# Patient Record
Sex: MALE | Race: WHITE | NOT HISPANIC OR LATINO | Employment: FULL TIME | URBAN - METROPOLITAN AREA
[De-identification: names, ages, dates, MRNs, and addresses within clinical notes are randomized per-mention and may not be internally consistent; named-entity substitution may affect disease eponyms.]

---

## 2021-03-11 ENCOUNTER — OFFICE VISIT (OUTPATIENT)
Dept: FAMILY MEDICINE CLINIC | Facility: CLINIC | Age: 26
End: 2021-03-11

## 2021-03-11 VITALS
SYSTOLIC BLOOD PRESSURE: 130 MMHG | WEIGHT: 266.8 LBS | HEART RATE: 80 BPM | HEIGHT: 78 IN | RESPIRATION RATE: 16 BRPM | TEMPERATURE: 97.7 F | DIASTOLIC BLOOD PRESSURE: 78 MMHG | OXYGEN SATURATION: 99 % | BODY MASS INDEX: 30.87 KG/M2

## 2021-03-11 DIAGNOSIS — Z00.00 WELL ADULT EXAM: Primary | ICD-10-CM

## 2021-03-11 PROCEDURE — 99385 PREV VISIT NEW AGE 18-39: CPT | Performed by: FAMILY MEDICINE

## 2021-03-11 PROCEDURE — 90715 TDAP VACCINE 7 YRS/> IM: CPT | Performed by: FAMILY MEDICINE

## 2021-03-11 PROCEDURE — 90471 IMMUNIZATION ADMIN: CPT | Performed by: FAMILY MEDICINE

## 2021-03-11 NOTE — PROGRESS NOTES
Paolo Rollins is a 26 y.o. male who presents today to establish care and complete annual exam..    Chief Complaint   Patient presents with   • Establish Care     has not seen anyone since his pediatrician   • Annual Exam        Patient is here to establish care as he has not seen a PCP since his pediatrician.  Patient has no chronic medical conditions.  He sees dentist and optometrist regularly.  He has not been seen by the dermatologist.  Patient eats a varied and healthy diet but does not exercise at this time.  Patient has no acute complaints today.       Review of Systems   Constitutional: Negative for fever and unexpected weight loss.   HENT: Negative for congestion, ear pain and sore throat.    Eyes: Negative for visual disturbance.   Respiratory: Negative for cough, shortness of breath and wheezing.    Cardiovascular: Negative for chest pain and palpitations.   Gastrointestinal: Negative for abdominal pain, blood in stool, constipation, diarrhea, nausea, vomiting and GERD.   Endocrine: Negative for polydipsia and polyuria.   Genitourinary: Negative for difficulty urinating.   Musculoskeletal: Negative for joint swelling.   Skin: Negative for rash and skin lesions.   Allergic/Immunologic: Negative for environmental allergies.   Neurological: Negative for seizures and syncope.   Hematological: Does not bruise/bleed easily.   Psychiatric/Behavioral: Negative for suicidal ideas.        PHQ-9 Depression Screening  Little interest or pleasure in doing things? 0   Feeling down, depressed, or hopeless? 0   Trouble falling or staying asleep, or sleeping too much?     Feeling tired or having little energy?     Poor appetite or overeating?     Feeling bad about yourself - or that you are a failure or have let yourself or your family down?     Trouble concentrating on things, such as reading the newspaper or watching television?     Moving or speaking so slowly that other people could have noticed? Or the opposite -  "being so fidgety or restless that you have been moving around a lot more than usual?     Thoughts that you would be better off dead, or of hurting yourself in some way?     PHQ-9 Total Score 0   If you checked off any problems, how difficult have these problems made it for you to do your work, take care of things at home, or get along with other people?         Past Medical History:   Diagnosis Date   • Concussion     x 3 at age 10/13/and 15        Past Surgical History:   Procedure Laterality Date   • WISDOM TOOTH EXTRACTION          Family History   Problem Relation Age of Onset   • Depression Mother    • No Known Problems Father    • Mental illness Sister         borderline personality disorder   • Ovarian cancer Maternal Grandmother    • Diabetes Maternal Grandfather    • Heart disease Maternal Grandfather    • Heart disease Paternal Grandmother    • Heart disease Paternal Grandfather    • Pancreatic cancer Paternal Uncle    • Thyroid cancer Paternal Uncle         Social History     Socioeconomic History   • Marital status:      Spouse name: Not on file   • Number of children: Not on file   • Years of education: Not on file   • Highest education level: Not on file   Tobacco Use   • Smoking status: Never Smoker   • Smokeless tobacco: Never Used   Vaping Use   • Vaping Use: Never used   Substance and Sexual Activity   • Alcohol use: Yes     Alcohol/week: 2.0 standard drinks     Types: 1 Glasses of wine, 1 Cans of beer per week     Comment: occasional   • Drug use: Never   • Sexual activity: Yes     Partners: Female     Birth control/protection: None        No current outpatient medications on file prior to visit.     No current facility-administered medications on file prior to visit.       No Known Allergies     Visit Vitals  /78 (BP Location: Right arm, Patient Position: Sitting, Cuff Size: Adult)   Pulse 80   Temp 97.7 °F (36.5 °C) (Temporal)   Resp 16   Ht 198.1 cm (78\")   Wt 121 kg (266 lb 12.8 " oz)   SpO2 99%   BMI 30.83 kg/m²      Body mass index is 30.83 kg/m².    Physical Exam  Constitutional:       General: He is not in acute distress.     Appearance: He is well-developed. He is not diaphoretic.   HENT:      Head: Atraumatic.   Cardiovascular:      Rate and Rhythm: Normal rate and regular rhythm.      Heart sounds: Normal heart sounds. No murmur. No friction rub. No gallop.    Pulmonary:      Effort: Pulmonary effort is normal. No respiratory distress.      Breath sounds: Normal breath sounds. No wheezing or rales.   Musculoskeletal:      Cervical back: Normal range of motion and neck supple.   Skin:     General: Skin is warm and dry.   Neurological:      Mental Status: He is alert and oriented to person, place, and time.   Psychiatric:         Behavior: Behavior normal.          No results found for this or any previous visit.     Problems Addressed this Visit        Health Encounters    Well adult exam - Primary     The patient is here for health maintenance visit.  Currently, the patient consumes a healthy diet and has an inadequate exercise regimen.  Screening lab work is ordered.  Immunizations were reviewed today.  Advice and education was given regarding nutrition, aerobic exercise, routine dental evaluations, routine eye exams, reproductive health, cardiovascular risk reduction, sunscreen use, self skin examination (annual dermatology evaluations) and seatbelt use (general overall safety).  Further recommendations will be given if needed after lab evaluation.  Annual wellness evaluation is recommended.           Relevant Orders    Tdap Vaccine Greater Than or Equal To 8yo IM      Diagnoses       Codes Comments    Well adult exam    -  Primary ICD-10-CM: Z00.00  ICD-9-CM: V70.0           Return in about 1 year (around 3/11/2022) for Annual.    Parts of this office note have been dictated by voice recognition software. Grammatical and/or spelling errors may be present.     Roque Grant,  MD  3/11/2021

## 2021-03-11 NOTE — PATIENT INSTRUCTIONS
Preventive Care 21-39 Years Old, Male  Preventive care refers to lifestyle choices and visits with your health care provider that can promote health and wellness. This includes:  · A yearly physical exam. This is also called an annual well check.  · Regular dental and eye exams.  · Immunizations.  · Screening for certain conditions.  · Healthy lifestyle choices, such as eating a healthy diet, getting regular exercise, not using drugs or products that contain nicotine and tobacco, and limiting alcohol use.  What can I expect for my preventive care visit?  Physical exam  Your health care provider will check:  · Height and weight. These may be used to calculate body mass index (BMI), which is a measurement that tells if you are at a healthy weight.  · Heart rate and blood pressure.  · Your skin for abnormal spots.  Counseling  Your health care provider may ask you questions about:  · Alcohol, tobacco, and drug use.  · Emotional well-being.  · Home and relationship well-being.  · Sexual activity.  · Eating habits.  · Work and work environment.  What immunizations do I need?    Influenza (flu) vaccine  · This is recommended every year.  Tetanus, diphtheria, and pertussis (Tdap) vaccine  · You may need a Td booster every 10 years.  Varicella (chickenpox) vaccine  · You may need this vaccine if you have not already been vaccinated.  Human papillomavirus (HPV) vaccine  · If recommended by your health care provider, you may need three doses over 6 months.  Measles, mumps, and rubella (MMR) vaccine  · You may need at least one dose of MMR. You may also need a second dose.  Meningococcal conjugate (MenACWY) vaccine  · One dose is recommended if you are 19-21 years old and a first-year college student living in [place], or if you have one of several medical conditions. You may also need additional booster doses.  Pneumococcal conjugate (PCV13) vaccine  · You may need this if you have certain conditions and were not  previously vaccinated.  Pneumococcal polysaccharide (PPSV23) vaccine  · You may need one or two doses if you smoke cigarettes or if you have certain conditions.  Hepatitis A vaccine  · You may need this if you have certain conditions or if you travel or work in places where you may be exposed to hepatitis A.  Hepatitis B vaccine  · You may need this if you have certain conditions or if you travel or work in places where you may be exposed to hepatitis B.  Haemophilus influenzae type b (Hib) vaccine  · You may need this if you have certain risk factors.  You may receive vaccines as individual doses or as more than one vaccine together in one shot (combination vaccines). Talk with your health care provider about the risks and benefits of combination vaccines.  What tests do I need?  Blood tests  · Lipid and cholesterol levels. These may be checked every 5 years starting at age 20.  · Hepatitis C test.  · Hepatitis B test.  Screening    · Diabetes screening. This is done by checking your blood sugar (glucose) after you have not eaten for a while (fasting).  · Sexually transmitted disease (STD) testing.  Talk with your health care provider about your test results, treatment options, and if necessary, the need for more tests.  Follow these instructions at home:  Eating and drinking    · Eat a diet that includes fresh fruits and vegetables, whole grains, lean protein, and low-fat dairy products.  · Take vitamin and mineral supplements as recommended by your health care provider.  · Do not drink alcohol if your health care provider tells you not to drink.  · If you drink alcohol:  ? Limit how much you have to 0-2 drinks a day.  ? Be aware of how much alcohol is in your drink. In the [place] one drink equals one 12 oz bottle of beer (355 mL), one 5 oz glass of wine (148 mL), or one 1½ oz glass of hard liquor (44 mL).  Lifestyle  · Take daily care of your teeth and gums.  · Stay active. Exercise for at least 30 minutes on 5 or  more days each week.  · Do not use any products that contain nicotine or tobacco, such as cigarettes, e-cigarettes, and chewing tobacco. If you need help quitting, ask your health care provider.  · If you are sexually active, practice safe sex. Use a condom or other form of protection to prevent STIs (sexually transmitted infections).  What's next?  · Go to your health care provider once a year for a well check visit.  · Ask your health care provider how often you should have your eyes and teeth checked.  · Stay up to date on all vaccines.  This information is not intended to replace advice given to you by your health care provider. Make sure you discuss any questions you have with your health care provider.  Document Revised: 12/12/2019 Document Reviewed: 12/12/2019  Elsevier Patient Education © 2020 [employer]

## 2021-11-18 PROCEDURE — U0004 COV-19 TEST NON-CDC HGH THRU: HCPCS | Performed by: NURSE PRACTITIONER

## 2022-03-22 ENCOUNTER — OFFICE VISIT (OUTPATIENT)
Dept: FAMILY MEDICINE CLINIC | Facility: CLINIC | Age: 27
End: 2022-03-22

## 2022-03-22 VITALS
WEIGHT: 264 LBS | HEART RATE: 66 BPM | SYSTOLIC BLOOD PRESSURE: 130 MMHG | OXYGEN SATURATION: 99 % | DIASTOLIC BLOOD PRESSURE: 80 MMHG | HEIGHT: 78 IN | TEMPERATURE: 97.8 F | BODY MASS INDEX: 30.55 KG/M2

## 2022-03-22 DIAGNOSIS — Z00.00 WELL ADULT EXAM: Primary | ICD-10-CM

## 2022-03-22 PROCEDURE — 99395 PREV VISIT EST AGE 18-39: CPT | Performed by: FAMILY MEDICINE

## 2022-03-22 NOTE — PROGRESS NOTES
Paolo Rollins is a 27 y.o. male who presents today to complete annual exam.    Chief Complaint   Patient presents with   • Annual Exam        Patient presents for annual exam with no acute complaints.    Diet: regular. Patient working on portion control and cooking at home more.     Physical activity: walks dog a couple miles daily. No exercise regimen    Sleep: no sleep issues. Average 8 hours a night    Mood: no mood issues PHQ-2: 0    Up to date on dentist  Not up to date on optometry    Immunizations:  Denies annual flu vaccine  No COVID booster. He got COVID in January 2022         Review of Systems   Constitutional: Negative for chills and fever.   Eyes: Negative for blurred vision.   Respiratory: Negative for shortness of breath.    Cardiovascular: Negative for chest pain.   Gastrointestinal: Negative for abdominal pain, blood in stool, constipation, diarrhea, nausea and vomiting.   Genitourinary: Negative for dysuria.   Allergic/Immunologic: Negative for environmental allergies.   Neurological: Negative for dizziness, seizures, syncope and headache.   Psychiatric/Behavioral: Negative for suicidal ideas and depressed mood. The patient is not nervous/anxious.         PHQ-9 Depression Screening  Little interest or pleasure in doing things? 0-->not at all   Feeling down, depressed, or hopeless? 0-->not at all   Trouble falling or staying asleep, or sleeping too much?     Feeling tired or having little energy?     Poor appetite or overeating?     Feeling bad about yourself - or that you are a failure or have let yourself or your family down?     Trouble concentrating on things, such as reading the newspaper or watching television?     Moving or speaking so slowly that other people could have noticed? Or the opposite - being so fidgety or restless that you have been moving around a lot more than usual?     Thoughts that you would be better off dead, or of hurting yourself in some way?     PHQ-9 Total Score 0   If  "you checked off any problems, how difficult have these problems made it for you to do your work, take care of things at home, or get along with other people?         Past Medical History:   Diagnosis Date   • Concussion     x 3 at age 10/13/and 15        Past Surgical History:   Procedure Laterality Date   • WISDOM TOOTH EXTRACTION          Family History   Problem Relation Age of Onset   • Depression Mother    • Miscarriages / Stillbirths Mother    • No Known Problems Father    • Mental illness Sister    • Depression Sister    • Ovarian cancer Maternal Grandmother    • Diabetes Maternal Grandfather    • Heart disease Maternal Grandfather    • Heart disease Paternal Grandmother    • Heart disease Paternal Grandfather    • Pancreatic cancer Paternal Uncle    • Thyroid cancer Paternal Uncle         Social History     Socioeconomic History   • Marital status:    Tobacco Use   • Smoking status: Never Smoker   • Smokeless tobacco: Never Used   Vaping Use   • Vaping Use: Never used   Substance and Sexual Activity   • Alcohol use: Yes     Alcohol/week: 2.0 standard drinks     Types: 1 Glasses of wine, 1 Cans of beer per week     Comment: occasional   • Drug use: Never   • Sexual activity: Yes     Partners: Female     Birth control/protection: None     Comment: wife only        No current outpatient medications on file prior to visit.     No current facility-administered medications on file prior to visit.       No Known Allergies     Visit Vitals  /80   Pulse 66   Temp 97.8 °F (36.6 °C)   Ht 198.1 cm (78\")   Wt 120 kg (264 lb)   SpO2 99%   BMI 30.51 kg/m²      Body mass index is 30.51 kg/m².    Physical Exam  Constitutional:       Appearance: Normal appearance.   Cardiovascular:      Rate and Rhythm: Normal rate and regular rhythm.      Heart sounds: Normal heart sounds. No murmur heard.    No friction rub. No gallop.   Pulmonary:      Effort: Pulmonary effort is normal. No respiratory distress.      Breath " sounds: Normal breath sounds. No stridor. No wheezing, rhonchi or rales.   Abdominal:      General: Bowel sounds are normal. There is no distension.      Palpations: Abdomen is soft. There is no mass.      Tenderness: There is no abdominal tenderness. There is no guarding or rebound.      Hernia: No hernia is present.   Lymphadenopathy:      Cervical: No cervical adenopathy.   Skin:     General: Skin is warm and dry.   Neurological:      Mental Status: He is alert and oriented to person, place, and time.   Psychiatric:         Mood and Affect: Mood normal.          Body mass index is 30.51 kg/m².       Problems Addressed this Visit        Health Encounters    Well adult exam - Primary     The patient is here for health maintenance visit.  Currently, the patient consumes a healthy diet and has an inadequate exercise regimen.  Screening lab work is ordered.  Immunizations were reviewed today.  Advice and education was given regarding nutrition, aerobic exercise, routine dental evaluations, routine eye exams, reproductive health, cardiovascular risk reduction, sunscreen use, self skin examination (annual dermatology evaluations) and seatbelt use (general overall safety).  Further recommendations will be given if needed after lab evaluation.  Annual wellness evaluation is recommended.               Diagnoses       Codes Comments    Well adult exam    -  Primary ICD-10-CM: Z00.00  ICD-9-CM: V70.0           Return in about 1 year (around 3/22/2023) for Annual.    I attest that I have reviewed the student note and that the components of the history of the present illness, the physical exam, and the assessment and plan documented were performed by me or were performed in my presence by the student and verified by me.    Roque Grant MD  3/22/2022

## 2022-03-22 NOTE — PATIENT INSTRUCTIONS

## 2022-09-12 DIAGNOSIS — N46.9 INFERTILITY MALE: Primary | ICD-10-CM

## 2022-11-08 ENCOUNTER — OFFICE VISIT (OUTPATIENT)
Dept: UROLOGY | Facility: CLINIC | Age: 27
End: 2022-11-08

## 2022-11-08 VITALS — BODY MASS INDEX: 30.55 KG/M2 | HEIGHT: 78 IN | WEIGHT: 264 LBS

## 2022-11-08 DIAGNOSIS — N46.01 INFERTILITY DUE TO AZOOSPERMIA: Primary | ICD-10-CM

## 2022-11-08 PROCEDURE — 99204 OFFICE O/P NEW MOD 45 MIN: CPT | Performed by: UROLOGY

## 2022-11-08 NOTE — PROGRESS NOTES
Office Visit [facility]      Patient Name: Paolo Rollins  : 1995   MRN: [id number]     Chief Complaint: Male infertility    Referring Provider: Roque Grant MD    History of Present Illness: Paolo Rollins is a 27 y.o. male who presents to Urology today for evaluation regarding concern for male infertility.  Patient is an otherwise healthy 27-year-old male.  He reports that he is .  He and his wife have attempted pregnancy over a 1 year period unsuccessfully.  She has undergone female factor work-up which has been unremarkable.  Patient underwent semen analysis which was ordered by his wife's OB/GYN.  Semen analysis demonstrates azoospermia.    The patient denies past urologic evaluation including instrumentation or procedure.  He denies any knowledge of childhood urologic diagnoses including undescended testicle.  Denies any childhood viral illness such as mumps.  He denies new onset headache, visual change, change in sense of smell.  He denies lower urinary tract symptoms.  Denies obstructive urinary symptoms.  Denies any difficulty with erection.  He denies hematuria or history of urinary tract infection/STI.  He does report a potential family history of infertility and a maternal grandfather.  He is uncertain regarding information.    He has not had a metabolic or hormonal lab panel obtained at this time.    Patient is a never smoker, denies illicit drug use.      Subjective      Review of System: Review of Systems   Constitutional: Negative for chills, fatigue, fever and unexpected weight change.   HENT: Negative for sore throat.    Eyes: Negative for visual disturbance.   Respiratory: Negative for cough, chest tightness and shortness of breath.    Cardiovascular: Negative for chest pain and leg swelling.   Gastrointestinal: Negative for blood in stool, constipation, diarrhea, nausea, rectal pain and vomiting.   Genitourinary: Negative for decreased urine volume, difficulty  urinating, dysuria, enuresis, flank pain, frequency, genital sores, hematuria and urgency.   Musculoskeletal: Negative for back pain and joint swelling.   Skin: Negative for rash and wound.   Neurological: Negative for seizures, speech difficulty, weakness and headaches.   Psychiatric/Behavioral: Negative for confusion, sleep disturbance and suicidal ideas. The patient is not nervous/anxious.       I have reviewed the ROS documented by my clinical staff, updated appropriately and I agree. Syed Cruz MD    Past Medical History:   Past Medical History:   Diagnosis Date   • Concussion     x 3 at age 10/13/and 15       Past Surgical History:   Past Surgical History:   Procedure Laterality Date   • WISDOM TOOTH EXTRACTION         Family History:   Family History   Problem Relation Age of Onset   • Depression Mother    • Miscarriages / Stillbirths Mother    • No Known Problems Father    • Mental illness Sister    • Depression Sister    • Ovarian cancer Maternal Grandmother    • Cancer Maternal Grandmother         Ovarian   • Diabetes Maternal Grandfather    • Heart disease Maternal Grandfather    • Heart disease Paternal Grandmother    • Heart disease Paternal Grandfather    • Pancreatic cancer Paternal Uncle    • Thyroid cancer Paternal Uncle    • Cancer Paternal Uncle         Thyroid   • Cancer Paternal Uncle         Pancreatic       Social History:   Social History     Socioeconomic History   • Marital status:    Tobacco Use   • Smoking status: Never   • Smokeless tobacco: Never   Vaping Use   • Vaping Use: Never used   Substance and Sexual Activity   • Alcohol use: Yes     Alcohol/week: 2.0 standard drinks     Types: 1 Glasses of wine, 1 Cans of beer per week     Comment: occasional   • Drug use: Never   • Sexual activity: Yes     Partners: Female     Birth control/protection: None     Comment: wife only       Medications:   No current outpatient medications on file.    Allergies:   No Known  "Allergies        Objective     Physical Exam:   Vital Signs:   Vitals:    11/08/22 1445   Weight: 120 kg (264 lb)   Height: 198.1 cm (77.99\")   PainSc: 0-No pain     Body mass index is 30.51 kg/m².     Physical Exam  Vitals and nursing note reviewed.   Constitutional:       Appearance: Normal appearance.   HENT:      Head: Normocephalic and atraumatic.   Cardiovascular:      Comments: Well perfused  Pulmonary:      Effort: Pulmonary effort is normal.   Abdominal:      General: Abdomen is flat.      Palpations: Abdomen is soft.   Musculoskeletal:         General: Normal range of motion.   Skin:     General: Skin is warm and dry.   Neurological:      General: No focal deficit present.      Mental Status: He is alert and oriented to person, place, and time. Mental status is at baseline.   Psychiatric:         Mood and Affect: Mood normal.         Behavior: Behavior normal.         Thought Content: Thought content normal.         Judgment: Judgment normal.         Genitourinary  Penis: circumcised penis, glans normal, no penile discharge.  No rashes/lesions.    Testes: descended bilaterally, no masses, nontender to palpation.  Bilateral palpable vas deferens remainder of scrotal contents normal. No hernia appreciated.      Labs:   Brief Urine Lab Results     None               No results found for: GLUCOSE, CALCIUM, NA, K, CO2, CL, BUN, CREATININE, EGFRIFAFRI, EGFRIFNONA, BCR, ANIONGAP    No results found for: WBC, HGB, HCT, MCV, PLT    Semen Analysis       Measures:   Tobacco:   Paolo Rollins  reports that he has never smoked. He has never used smokeless tobacco.. I have educated him on the risk of diseases from using tobacco products.  Assessment / Plan      Assessment/Plan:   27 y.o. male is here today for initial evaluation regarding work-up for male factor infertility.  We have reviewed semen analysis which was obtained above.  Normal volume, parameters, pH.  Azoospermia has been demonstrated.  Today we have " discussed the indication for repeat semen analysis for confirmation.  We have discussed the indication for metabolic and hormonal lab values.  We have reviewed potential etiologies for azoospermia, obstructive azoospermia versus nonobstructive azoospermia.  He denies any past urologic diagnoses, instrumentation or procedure.  He has an unremarkable past medical history.    During our discussion today we have described above initial work-up.  We have discussed potential referral to infertility specialist Dr. Orozco in [facility].  We have discussed that we are happy to obtain repeat semen analysis and initial metabolic work-up, make a referral or patient may contact infertility specialist for initial referral and work-up.  After our discussion today he reports that he would like to see infertility specialist.  We have discussed that we are happy to help in referral, if he has any further questions or concerns we are happy to assist in his care.    Diagnoses and all orders for this visit:    1. 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    I spent approximately 45 minutes providing clinical care for this patient; including review of patient's chart and provider documentation, face to face time spent with patient in examination room (obtaining history, performing physical exam, discussing diagnosis and management options), placing orders, and completing patient documentation.     Syed Cruz MD  [facility] [place]

## 2022-11-12 PROBLEM — N46.01 INFERTILITY DUE TO AZOOSPERMIA: Status: ACTIVE | Noted: 2022-11-12

## 2023-04-11 ENCOUNTER — TRANSCRIBE ORDERS (OUTPATIENT)
Dept: LAB | Facility: HOSPITAL | Age: 28
End: 2023-04-11
Payer: COMMERCIAL

## 2023-04-11 ENCOUNTER — LAB (OUTPATIENT)
Dept: LAB | Facility: HOSPITAL | Age: 28
End: 2023-04-11
Payer: COMMERCIAL

## 2023-04-11 DIAGNOSIS — Z11.3 SCREENING EXAMINATION FOR VENEREAL DISEASE: ICD-10-CM

## 2023-04-11 DIAGNOSIS — Z11.4 SCREENING FOR HUMAN IMMUNODEFICIENCY VIRUS: ICD-10-CM

## 2023-04-11 DIAGNOSIS — Z11.3 SCREENING EXAMINATION FOR VENEREAL DISEASE: Primary | ICD-10-CM

## 2023-04-11 PROCEDURE — 86644 CMV ANTIBODY: CPT

## 2023-04-11 PROCEDURE — 86803 HEPATITIS C AB TEST: CPT

## 2023-04-11 PROCEDURE — 86703 HIV-1/HIV-2 1 RESULT ANTBDY: CPT

## 2023-04-11 PROCEDURE — 86780 TREPONEMA PALLIDUM: CPT

## 2023-04-11 PROCEDURE — 87801 DETECT AGNT MULT DNA AMPLI: CPT

## 2023-04-11 PROCEDURE — 87340 HEPATITIS B SURFACE AG IA: CPT

## 2023-04-11 PROCEDURE — 36415 COLL VENOUS BLD VENIPUNCTURE: CPT

## 2023-04-11 PROCEDURE — 86790 VIRUS ANTIBODY NOS: CPT

## 2023-04-11 PROCEDURE — 87491 CHLMYD TRACH DNA AMP PROBE: CPT

## 2023-04-11 PROCEDURE — 87591 N.GONORRHOEAE DNA AMP PROB: CPT

## 2023-04-11 PROCEDURE — 86704 HEP B CORE ANTIBODY TOTAL: CPT

## 2023-04-14 LAB
C TRACH RRNA SPEC DONR QL NAA+PROBE: NEGATIVE
CMV AB SERPL DONR QL: NON REACTIVE
HBV CORE AB SER DONR QL IA: NEGATIVE
HBV SURFACE AG SER DONR QL IA: NEGATIVE
HCV AB SER DONR QL IA: NEGATIVE
HIV 1+2 AB+HIV1 P24 AG SERPL QL IA: NEGATIVE
HIV1 RNA SERPL DONR QL PROBE AMP: NORMAL
HTLV I+II AB SER DONR QL: NEGATIVE
N GONORRHOEA RRNA SPEC DONR QL NAA+PROBE: NEGATIVE
T PALLIDUM IGG SER DONR QL: NON REACTIVE

## 2023-06-08 ENCOUNTER — OFFICE VISIT (OUTPATIENT)
Dept: FAMILY MEDICINE CLINIC | Facility: CLINIC | Age: 28
End: 2023-06-08
Payer: COMMERCIAL

## 2023-06-08 VITALS
WEIGHT: 274.2 LBS | HEART RATE: 63 BPM | BODY MASS INDEX: 33.39 KG/M2 | HEIGHT: 76 IN | SYSTOLIC BLOOD PRESSURE: 126 MMHG | DIASTOLIC BLOOD PRESSURE: 90 MMHG | TEMPERATURE: 98 F | RESPIRATION RATE: 22 BRPM | OXYGEN SATURATION: 99 %

## 2023-06-08 DIAGNOSIS — Z00.00 WELL ADULT EXAM: Primary | ICD-10-CM

## 2023-06-08 DIAGNOSIS — R20.2 TINGLING IN EXTREMITIES: ICD-10-CM

## 2023-06-08 PROCEDURE — 99395 PREV VISIT EST AGE 18-39: CPT | Performed by: FAMILY MEDICINE

## 2023-06-08 NOTE — PATIENT INSTRUCTIONS

## 2023-06-08 NOTE — ASSESSMENT & PLAN NOTE
The patient is here for health maintenance visit.  Currently, the patient consumes a healthy diet and has an adequate exercise regimen.   Advice and education was given regarding nutrition, aerobic exercise, routine dental evaluations, routine eye exams, reproductive health, cardiovascular risk reduction, sunscreen use, self skin examination (annual dermatology evaluations) and seatbelt use (general overall safety). Annual wellness evaluation is recommended.

## 2023-06-08 NOTE — PROGRESS NOTES
Paolo Rollins is a 28 y.o. male who presents today to complete annual exam.    Chief Complaint   Patient presents with    Annual Exam        Patient is here for annual exam. He walks the dog every day for exercise. He has been working on improving his diet and has been eating salads for lunch and has lost some weight. He eats a small healthy breakfast. Dinner is typically a protein with veggies and a starch. He sees the dentist twice a year and optometrist once a year. He has not seen the dermatologist. He has had some depressed mood lately related to finding out he can not have children but he feels this will pass and does not want meds or counseling. He denies HI/SI. He has also had some tingling and numbness in his hands and legs but it only occurs when he has been sitting with his legs crossed or in his hands when leaning on his elbow or sleeping on his arm.        Review of Systems   Constitutional:  Negative for fever and unexpected weight loss.   HENT:  Negative for congestion, ear pain and sore throat.    Eyes:  Negative for visual disturbance.   Respiratory:  Negative for cough, shortness of breath and wheezing.    Cardiovascular:  Negative for chest pain and palpitations.   Gastrointestinal:  Negative for abdominal pain, blood in stool, constipation, diarrhea, nausea, vomiting and GERD.   Endocrine: Negative for polydipsia and polyuria.   Genitourinary:  Negative for difficulty urinating.   Musculoskeletal:  Negative for joint swelling.   Skin:  Negative for rash and skin lesions.   Allergic/Immunologic: Negative for environmental allergies.   Neurological:  Positive for numbness (tingling and numbness like his limbs are asleep after compression such as sleeping on arm or crossing legs). Negative for seizures and syncope.   Hematological:  Does not bruise/bleed easily.   Psychiatric/Behavioral:  Positive for dysphoric mood and depressed mood. Negative for self-injury and suicidal ideas.       PHQ-9  Depression Screening  Little interest or pleasure in doing things? 0-->not at all   Feeling down, depressed, or hopeless? 1-->several days   Trouble falling or staying asleep, or sleeping too much?     Feeling tired or having little energy?     Poor appetite or overeating?     Feeling bad about yourself - or that you are a failure or have let yourself or your family down?     Trouble concentrating on things, such as reading the newspaper or watching television?     Moving or speaking so slowly that other people could have noticed? Or the opposite - being so fidgety or restless that you have been moving around a lot more than usual?     Thoughts that you would be better off dead, or of hurting yourself in some way?     PHQ-9 Total Score 1   If you checked off any problems, how difficult have these problems made it for you to do your work, take care of things at home, or get along with other people?         Past Medical History:   Diagnosis Date    Concussion     x 3 at age [age]/and 15        Past Surgical History:   Procedure Laterality Date    WISDOM TOOTH EXTRACTION          Family History   Problem Relation Age of Onset    Depression Mother     Miscarriages / Stillbirths Mother     Atrial fibrillation Father     Mental illness Sister     Depression Sister     Ovarian cancer Maternal Grandmother     Diabetes Maternal Grandfather     Heart disease Maternal Grandfather     Heart disease Paternal Grandmother     Heart disease Paternal Grandfather     Hearing loss Paternal Uncle         Caused by medication as a baby    Cancer Paternal Uncle         Thyroid    Cancer Paternal Uncle         Pancreatic        Social History     Socioeconomic History    Marital status:    Tobacco Use    Smoking status: Never    Smokeless tobacco: Never   Vaping Use    Vaping Use: Never used   Substance and Sexual Activity    Alcohol use: Yes     Alcohol/week: 2.0 standard drinks     Types: 1 Glasses of wine, 1 Cans of beer per  "week     Comment: occasional    Drug use: Never    Sexual activity: Yes     Partners: Female     Birth control/protection: None     Comment: wife only        No current outpatient medications on file prior to visit.     No current facility-administered medications on file prior to visit.       No Known Allergies     Visit Vitals  /90   Pulse 63   Temp 98 °F (36.7 °C)   Resp 22   Ht 193 cm (76\")   Wt 124 kg (274 lb 3.2 oz)   SpO2 99%   BMI 33.38 kg/m²      Body mass index is 33.38 kg/m².    Physical Exam  Constitutional:       General: He is not in acute distress.     Appearance: He is well-developed. He is not diaphoretic.   HENT:      Head: Atraumatic.   Cardiovascular:      Rate and Rhythm: Normal rate and regular rhythm.      Heart sounds: Normal heart sounds. No murmur heard.    No friction rub. No gallop.   Pulmonary:      Effort: Pulmonary effort is normal. No respiratory distress.      Breath sounds: Normal breath sounds. No stridor. No wheezing, rhonchi or rales.   Abdominal:      General: Bowel sounds are normal. There is no distension.      Palpations: Abdomen is soft. There is no mass.      Tenderness: There is no abdominal tenderness. There is no guarding or rebound.      Hernia: No hernia is present.   Musculoskeletal:      Right shoulder: Normal.      Left shoulder: Normal.      Right elbow: Normal.      Left elbow: Normal.      Cervical back: Normal range of motion and neck supple.   Skin:     General: Skin is warm and dry.   Neurological:      Mental Status: He is alert and oriented to person, place, and time.      Motor: No weakness.   Psychiatric:         Behavior: Behavior normal.             Problems Addressed this Visit          Health Encounters    Well adult exam - Primary     The patient is here for health maintenance visit.  Currently, the patient consumes a healthy diet and has an adequate exercise regimen.   Advice and education was given regarding nutrition, aerobic exercise, " routine dental evaluations, routine eye exams, reproductive health, cardiovascular risk reduction, sunscreen use, self skin examination (annual dermatology evaluations) and seatbelt use (general overall safety). Annual wellness evaluation is recommended.              Symptoms and Signs    Tingling in extremities     Tingling in extremities only after compression.  Patient was offered further evaluation such as EMG and blood work but he declines at this time.  He return to clinic if symptoms fail to persist or worsen.          Diagnoses         Codes Comments    Well adult exam    -  Primary ICD-10-CM: Z00.00  ICD-9-CM: V70.0     Tingling in extremities     ICD-10-CM: R20.2  ICD-9-CM: 782.0             Return in about 1 year (around 6/8/2024) for Annual.    Roque Grant MD  6/8/2023

## 2023-06-08 NOTE — ASSESSMENT & PLAN NOTE
Tingling in extremities only after compression.  Patient was offered further evaluation such as EMG and blood work but he declines at this time.  He return to clinic if symptoms fail to persist or worsen.

## 2024-09-17 ENCOUNTER — OFFICE VISIT (OUTPATIENT)
Dept: FAMILY MEDICINE CLINIC | Facility: CLINIC | Age: 29
End: 2024-09-17
Payer: COMMERCIAL

## 2024-09-17 VITALS
BODY MASS INDEX: 32.05 KG/M2 | HEIGHT: 78 IN | HEART RATE: 72 BPM | OXYGEN SATURATION: 99 % | SYSTOLIC BLOOD PRESSURE: 122 MMHG | TEMPERATURE: 98 F | DIASTOLIC BLOOD PRESSURE: 80 MMHG | WEIGHT: 277 LBS

## 2024-09-17 DIAGNOSIS — F41.9 ANXIETY: ICD-10-CM

## 2024-09-17 DIAGNOSIS — Z83.2 FAMILY HISTORY OF FACTOR V LEIDEN MUTATION: ICD-10-CM

## 2024-09-17 DIAGNOSIS — E66.09 CLASS 1 OBESITY DUE TO EXCESS CALORIES WITHOUT SERIOUS COMORBIDITY WITH BODY MASS INDEX (BMI) OF 32.0 TO 32.9 IN ADULT: ICD-10-CM

## 2024-09-17 DIAGNOSIS — Z00.00 WELL ADULT EXAM: Primary | ICD-10-CM

## 2024-09-17 PROBLEM — E66.811 CLASS 1 OBESITY DUE TO EXCESS CALORIES WITHOUT SERIOUS COMORBIDITY WITH BODY MASS INDEX (BMI) OF 32.0 TO 32.9 IN ADULT: Status: ACTIVE | Noted: 2024-09-17

## 2024-09-17 PROBLEM — Q93.59: Status: ACTIVE | Noted: 2024-09-17

## 2024-09-17 PROCEDURE — 99213 OFFICE O/P EST LOW 20 MIN: CPT | Performed by: FAMILY MEDICINE

## 2024-09-17 PROCEDURE — 99395 PREV VISIT EST AGE 18-39: CPT | Performed by: FAMILY MEDICINE

## 2024-09-17 RX ORDER — VENLAFAXINE HYDROCHLORIDE 37.5 MG/1
37.5 CAPSULE, EXTENDED RELEASE ORAL DAILY
Qty: 90 CAPSULE | Refills: 1 | Status: SHIPPED | OUTPATIENT
Start: 2024-09-17

## 2024-09-17 RX ORDER — BUSPIRONE HYDROCHLORIDE 5 MG/1
5 TABLET ORAL 3 TIMES DAILY PRN
Qty: 90 TABLET | Refills: 1 | Status: SHIPPED | OUTPATIENT
Start: 2024-09-17

## 2024-11-12 ENCOUNTER — OFFICE VISIT (OUTPATIENT)
Dept: FAMILY MEDICINE CLINIC | Facility: CLINIC | Age: 29
End: 2024-11-12
Payer: COMMERCIAL

## 2024-11-12 VITALS
OXYGEN SATURATION: 98 % | HEIGHT: 78 IN | HEART RATE: 82 BPM | DIASTOLIC BLOOD PRESSURE: 90 MMHG | BODY MASS INDEX: 32.12 KG/M2 | TEMPERATURE: 98.2 F | SYSTOLIC BLOOD PRESSURE: 126 MMHG | WEIGHT: 277.6 LBS

## 2024-11-12 DIAGNOSIS — F41.9 ANXIETY: Primary | ICD-10-CM

## 2024-11-12 PROCEDURE — 99213 OFFICE O/P EST LOW 20 MIN: CPT | Performed by: FAMILY MEDICINE

## 2024-11-12 RX ORDER — VENLAFAXINE HYDROCHLORIDE 37.5 MG/1
37.5 CAPSULE, EXTENDED RELEASE ORAL DAILY
Qty: 90 CAPSULE | Refills: 3 | Status: SHIPPED | OUTPATIENT
Start: 2024-11-12

## 2024-11-12 NOTE — PROGRESS NOTES
Paolo Rollins is a 29 y.o. male who presents today for Anxiety      Patient was seen back in September and was having anxiety issues.  His MERCEDES-7 at that time was a 10.  Patient was started on Effexor to take daily and BuSpar as needed. Patient has been taking effexor and has tolerated it well so far. He has used the buspar once or twice and feels like it helped as well.        MERCEDES-7  Over the last two weeks, how often have you been bothered by the following problems?  Feeling nervous, anxious or on edge: 0  Not being able to stop or control worryin  Worrying too much about different things: 0  Trouble Relaxin  Being so restless that it is hard to sit still: 1  Becoming easily annoyed or irritable: 1  Feeling afraid as if something awful might happen: 1  MERCEDES 7 Total Score: 3  If you checked any problems, how difficult have these problems made it for you to do your work, take care of things at home, or get along with other people: Not difficult at all        2024    11:18 AM   PHQ-2/PHQ-9 Depression Screening   Little interest or pleasure in doing things Not at all   Feeling down, depressed, or hopeless Not at all   How difficult have these problems made it for you to do your work, take care of things at home, or get along with other people? Not difficult at all         Review of Systems   Constitutional:  Negative for fever and unexpected weight loss.   HENT:  Negative for congestion, ear pain and sore throat.    Eyes:  Negative for visual disturbance.   Respiratory:  Negative for cough, shortness of breath and wheezing.    Cardiovascular:  Negative for chest pain and palpitations.   Gastrointestinal:  Negative for abdominal pain, blood in stool, constipation, diarrhea, nausea, vomiting and GERD.   Endocrine: Negative for polydipsia and polyuria.   Genitourinary:  Negative for difficulty urinating.   Musculoskeletal:  Negative for joint swelling.   Skin:  Negative for rash and skin lesions.  "  Allergic/Immunologic: Negative for environmental allergies.   Neurological:  Negative for seizures and syncope.   Hematological:  Does not bruise/bleed easily.   Psychiatric/Behavioral:  Negative for suicidal ideas. The patient is nervous/anxious (improving).         The following portions of the patient's history were reviewed and updated as appropriate: allergies, current medications, past family history, past medical history, past social history, past surgical history and problem list.    Current Outpatient Medications on File Prior to Visit   Medication Sig Dispense Refill    busPIRone (BUSPAR) 5 MG tablet Take 1 tablet by mouth 3 (Three) Times a Day As Needed (anxiety). 90 tablet 1     No current facility-administered medications on file prior to visit.       No Known Allergies     Visit Vitals  /90 (BP Location: Left arm, Patient Position: Sitting, Cuff Size: Adult)   Pulse 82   Temp 98.2 °F (36.8 °C) (Infrared)   Ht 198.1 cm (78\")   Wt 126 kg (277 lb 9.6 oz)   SpO2 98%   BMI 32.08 kg/m²        Physical Exam  Constitutional:       General: He is not in acute distress.     Appearance: He is well-developed. He is not diaphoretic.   HENT:      Head: Atraumatic.   Cardiovascular:      Rate and Rhythm: Normal rate and regular rhythm.      Heart sounds: Normal heart sounds. No murmur heard.     No friction rub. No gallop.   Pulmonary:      Effort: Pulmonary effort is normal. No respiratory distress.      Breath sounds: Normal breath sounds. No stridor. No wheezing, rhonchi or rales.   Musculoskeletal:      Cervical back: Normal range of motion and neck supple.   Skin:     General: Skin is warm and dry.   Neurological:      Mental Status: He is alert and oriented to person, place, and time.   Psychiatric:         Behavior: Behavior normal.               Problems Addressed this Visit          Mental Health    Anxiety - Primary     Anxiety is improving with treatment.  Patient will continue current " medications.         Relevant Medications    venlafaxine XR (EFFEXOR-XR) 37.5 MG 24 hr capsule     Diagnoses         Codes Comments    Anxiety    -  Primary ICD-10-CM: F41.9  ICD-9-CM: 300.00             Return in about 3 months (around 2/12/2025) for Follow-up anxiety.    Roque Grant MD   11/14/2024

## 2025-02-19 ENCOUNTER — OFFICE VISIT (OUTPATIENT)
Dept: FAMILY MEDICINE CLINIC | Facility: CLINIC | Age: 30
End: 2025-02-19
Payer: COMMERCIAL

## 2025-02-19 VITALS
TEMPERATURE: 98.2 F | HEIGHT: 78 IN | WEIGHT: 277 LBS | DIASTOLIC BLOOD PRESSURE: 96 MMHG | SYSTOLIC BLOOD PRESSURE: 128 MMHG | BODY MASS INDEX: 32.05 KG/M2 | HEART RATE: 88 BPM

## 2025-02-19 DIAGNOSIS — F41.9 ANXIETY: Primary | ICD-10-CM

## 2025-02-19 DIAGNOSIS — R11.2 NAUSEA AND VOMITING, UNSPECIFIED VOMITING TYPE: ICD-10-CM

## 2025-02-19 PROCEDURE — 99213 OFFICE O/P EST LOW 20 MIN: CPT | Performed by: FAMILY MEDICINE

## 2025-02-19 RX ORDER — ONDANSETRON 8 MG/1
8 TABLET, ORALLY DISINTEGRATING ORAL EVERY 8 HOURS PRN
Qty: 30 TABLET | Refills: 1 | Status: SHIPPED | OUTPATIENT
Start: 2025-02-19

## 2025-02-19 RX ORDER — VENLAFAXINE HYDROCHLORIDE 37.5 MG/1
37.5 CAPSULE, EXTENDED RELEASE ORAL DAILY
Qty: 90 CAPSULE | Refills: 3 | Status: SHIPPED | OUTPATIENT
Start: 2025-02-19

## 2025-02-19 NOTE — PROGRESS NOTES
Paolo Rollins is a 29 y.o. male who presents today for Anxiety, Vomiting, and Diarrhea      Patient has been taking effexor as directed and has tolerated this well but has noticed he gets some sweating at night. He does not have sweating during the day. He is not sure if this is a medication side effect or due to them keeping the house warmer with a new baby. He has no other symptoms. Patient states he had a stomach bug and started throwing up Monday night and having diarrhea which lasted into Tuesday causing him to miss work and he would like a doctors note. Vomting and diarrhea have resolved. His wife and mother in law had similar symptoms over the weekend.          Review of Systems   Constitutional:  Positive for diaphoresis (only at night). Negative for fever and unexpected weight loss.   HENT:  Negative for congestion, ear pain and sore throat.    Eyes:  Negative for visual disturbance.   Respiratory:  Negative for cough, shortness of breath and wheezing.    Cardiovascular:  Negative for chest pain and palpitations.   Gastrointestinal:  Positive for diarrhea (improved), nausea (improved) and vomiting (improved). Negative for abdominal pain, blood in stool, constipation and GERD.   Endocrine: Negative for polydipsia and polyuria.   Genitourinary:  Negative for difficulty urinating.   Musculoskeletal:  Negative for joint swelling.   Skin:  Negative for rash and skin lesions.   Allergic/Immunologic: Negative for environmental allergies.   Neurological:  Negative for seizures and syncope.   Hematological:  Does not bruise/bleed easily.   Psychiatric/Behavioral:  Negative for suicidal ideas.         The following portions of the patient's history were reviewed and updated as appropriate: allergies, current medications, past family history, past medical history, past social history, past surgical history and problem list.    Current Outpatient Medications on File Prior to Visit   Medication Sig Dispense Refill     "busPIRone (BUSPAR) 5 MG tablet Take 1 tablet by mouth 3 (Three) Times a Day As Needed (anxiety). 90 tablet 1    [DISCONTINUED] venlafaxine XR (EFFEXOR-XR) 37.5 MG 24 hr capsule Take 1 capsule by mouth Daily. 90 capsule 3     No current facility-administered medications on file prior to visit.       No Known Allergies     Visit Vitals  /96   Pulse 88   Temp 98.2 °F (36.8 °C) (Infrared)   Ht 198.1 cm (77.99\")   Wt 126 kg (277 lb)   BMI 32.02 kg/m²        Physical Exam  Constitutional:       General: He is not in acute distress.     Appearance: He is well-developed. He is not diaphoretic.   HENT:      Head: Atraumatic.   Cardiovascular:      Rate and Rhythm: Normal rate and regular rhythm.      Heart sounds: Normal heart sounds. No murmur heard.     No friction rub. No gallop.   Pulmonary:      Effort: Pulmonary effort is normal. No respiratory distress.      Breath sounds: Normal breath sounds. No stridor. No wheezing, rhonchi or rales.   Abdominal:      General: Bowel sounds are normal. There is no distension.      Palpations: Abdomen is soft. There is no mass.      Tenderness: There is no abdominal tenderness. There is no guarding or rebound.      Hernia: No hernia is present.   Musculoskeletal:      Cervical back: Normal range of motion and neck supple.   Skin:     General: Skin is warm and dry.   Neurological:      Mental Status: He is alert and oriented to person, place, and time.   Psychiatric:         Behavior: Behavior normal.          Results for orders placed or performed in visit on 04/11/23   FDA Guideline Requirements, Male    Collection Time: 04/11/23  4:11 PM    Specimen: Blood   Result Value Ref Range    Hepatitis B Surface Ag Negative Negative    Hep B Core Total Ab Negative Negative    Hepatitis C Ab Negative Negative    DONOR Syphilis(T pallidum IgG) Non Reactive Non Reactive    HIV-1/HIV-2 Ab, HIV-1 p24 Ag Negative Negative    HIV 1/HCV/HBV FIDE Comment Non Reactive    HTLV-I/II Antibodies, " Qual Negative Negative    Cytomegalovirus CMV Total Ab Non Reactive Non Reactive    Chlamydia trachomatis, BAUDILIO Negative Negative    Gonococcus by Nucleic Acid Amp Negative Negative        Problems Addressed this Visit          Gastrointestinal Abdominal     Nausea and vomiting     Likely viral in nature and improving. Zofran given to use as needed. RTC/ED precautions given.          Relevant Medications    ondansetron ODT (ZOFRAN-ODT) 8 MG disintegrating tablet       Mental Health    Anxiety - Primary     Well controlled on current medication. Continue effexor daily and buspar as needed         Relevant Medications    venlafaxine XR (EFFEXOR-XR) 37.5 MG 24 hr capsule     Diagnoses         Codes Comments    Anxiety    -  Primary ICD-10-CM: F41.9  ICD-9-CM: 300.00     Nausea and vomiting, unspecified vomiting type     ICD-10-CM: R11.2  ICD-9-CM: 787.01             Return in about 7 months (around 9/18/2025) for Annual.    Roque Grant MD   2/19/2025

## 2025-02-19 NOTE — LETTER
February 19, 2025     Patient: Paolo Rollins   YOB: 1995   Date of Visit: 2/19/2025       To Whom It May Concern:     Paolo Rollins had a GI bug on 2/17/25-2/18/25. He can return to work at this time as he is currently symptom free.         Sincerely,        Roque Grant MD    CC: No Recipients